# Patient Record
Sex: FEMALE | Race: WHITE | NOT HISPANIC OR LATINO | Employment: FULL TIME | URBAN - METROPOLITAN AREA
[De-identification: names, ages, dates, MRNs, and addresses within clinical notes are randomized per-mention and may not be internally consistent; named-entity substitution may affect disease eponyms.]

---

## 2023-07-27 LAB
ALANINE AMINOTRANSFERASE (SGPT) (U/L) IN SER/PLAS: 17 U/L (ref 7–45)
ALBUMIN (G/DL) IN SER/PLAS: 4.3 G/DL (ref 3.4–5)
ALKALINE PHOSPHATASE (U/L) IN SER/PLAS: 72 U/L (ref 33–110)
ANION GAP IN SER/PLAS: 15 MMOL/L (ref 10–20)
ASPARTATE AMINOTRANSFERASE (SGOT) (U/L) IN SER/PLAS: 17 U/L (ref 9–39)
BASOPHILS (10*3/UL) IN BLOOD BY AUTOMATED COUNT: 0.04 X10E9/L (ref 0–0.1)
BASOPHILS/100 LEUKOCYTES IN BLOOD BY AUTOMATED COUNT: 0.4 % (ref 0–2)
BILIRUBIN TOTAL (MG/DL) IN SER/PLAS: 0.4 MG/DL (ref 0–1.2)
CALCIUM (MG/DL) IN SER/PLAS: 10 MG/DL (ref 8.6–10.3)
CARBON DIOXIDE, TOTAL (MMOL/L) IN SER/PLAS: 28 MMOL/L (ref 21–32)
CHLORIDE (MMOL/L) IN SER/PLAS: 101 MMOL/L (ref 98–107)
CREATININE (MG/DL) IN SER/PLAS: 1.01 MG/DL (ref 0.5–1.05)
EOSINOPHILS (10*3/UL) IN BLOOD BY AUTOMATED COUNT: 0.06 X10E9/L (ref 0–0.7)
EOSINOPHILS/100 LEUKOCYTES IN BLOOD BY AUTOMATED COUNT: 0.6 % (ref 0–6)
ERYTHROCYTE DISTRIBUTION WIDTH (RATIO) BY AUTOMATED COUNT: 14.4 % (ref 11.5–14.5)
ERYTHROCYTE MEAN CORPUSCULAR HEMOGLOBIN CONCENTRATION (G/DL) BY AUTOMATED: 30.7 G/DL (ref 32–36)
ERYTHROCYTE MEAN CORPUSCULAR VOLUME (FL) BY AUTOMATED COUNT: 88 FL (ref 80–100)
ERYTHROCYTES (10*6/UL) IN BLOOD BY AUTOMATED COUNT: 4.88 X10E12/L (ref 4–5.2)
GFR FEMALE: 67 ML/MIN/1.73M2
GLUCOSE (MG/DL) IN SER/PLAS: 83 MG/DL (ref 74–99)
HEMATOCRIT (%) IN BLOOD BY AUTOMATED COUNT: 42.7 % (ref 36–46)
HEMOGLOBIN (G/DL) IN BLOOD: 13.1 G/DL (ref 12–16)
IMMATURE GRANULOCYTES/100 LEUKOCYTES IN BLOOD BY AUTOMATED COUNT: 0.4 % (ref 0–0.9)
LEUKOCYTES (10*3/UL) IN BLOOD BY AUTOMATED COUNT: 10.2 X10E9/L (ref 4.4–11.3)
LYMPHOCYTES (10*3/UL) IN BLOOD BY AUTOMATED COUNT: 2.42 X10E9/L (ref 1.2–4.8)
LYMPHOCYTES/100 LEUKOCYTES IN BLOOD BY AUTOMATED COUNT: 23.7 % (ref 13–44)
MONOCYTES (10*3/UL) IN BLOOD BY AUTOMATED COUNT: 0.63 X10E9/L (ref 0.1–1)
MONOCYTES/100 LEUKOCYTES IN BLOOD BY AUTOMATED COUNT: 6.2 % (ref 2–10)
NEUTROPHILS (10*3/UL) IN BLOOD BY AUTOMATED COUNT: 7.03 X10E9/L (ref 1.2–7.7)
NEUTROPHILS/100 LEUKOCYTES IN BLOOD BY AUTOMATED COUNT: 68.7 % (ref 40–80)
PLATELETS (10*3/UL) IN BLOOD AUTOMATED COUNT: 346 X10E9/L (ref 150–450)
POTASSIUM (MMOL/L) IN SER/PLAS: 3.7 MMOL/L (ref 3.5–5.3)
PROTEIN TOTAL: 7.5 G/DL (ref 6.4–8.2)
SODIUM (MMOL/L) IN SER/PLAS: 140 MMOL/L (ref 136–145)
UREA NITROGEN (MG/DL) IN SER/PLAS: 10 MG/DL (ref 6–23)

## 2023-08-09 ENCOUNTER — TELEPHONE (OUTPATIENT)
Dept: PRIMARY CARE | Facility: CLINIC | Age: 53
End: 2023-08-09
Payer: COMMERCIAL

## 2023-08-09 DIAGNOSIS — I10 BENIGN ESSENTIAL HYPERTENSION: ICD-10-CM

## 2023-08-09 NOTE — TELEPHONE ENCOUNTER
PT WANTS TO CHANGE TO DR DEE     Rx Refill Request Telephone Encounter    Name:  Maryuri Arvizu  :  [id number]  Medication Name:    HYDROCHLOROTHIAZIDE 25MG Q/D   AMLODIPINE 5MG Q/D   Specific Pharmacy location:  [facility]  Date of last appointment:  2022  Date of next appointment:  23  Best number to reach patient:  [phone]

## 2023-08-10 RX ORDER — AMLODIPINE BESYLATE 5 MG/1
5 TABLET ORAL DAILY
Qty: 90 TABLET | Refills: 0 | Status: SHIPPED | OUTPATIENT
Start: 2023-08-10 | End: 2023-08-10

## 2023-08-10 RX ORDER — AMLODIPINE BESYLATE 5 MG/1
5 TABLET ORAL DAILY
COMMUNITY
Start: 2023-03-10 | End: 2023-08-10 | Stop reason: SDUPTHER

## 2023-08-10 RX ORDER — HYDROCHLOROTHIAZIDE 25 MG/1
25 TABLET ORAL DAILY
Qty: 90 TABLET | Refills: 0 | Status: SHIPPED | OUTPATIENT
Start: 2023-08-10 | End: 2023-08-10

## 2023-08-10 RX ORDER — HYDROCHLOROTHIAZIDE 25 MG/1
25 TABLET ORAL DAILY
COMMUNITY
Start: 2023-03-10 | End: 2023-08-10 | Stop reason: SDUPTHER

## 2023-08-17 PROBLEM — R61 NIGHT SWEATS: Status: ACTIVE | Noted: 2023-08-17

## 2023-08-17 PROBLEM — H66.90 OTITIS MEDIA, ACUTE: Status: RESOLVED | Noted: 2023-08-17 | Resolved: 2023-08-17

## 2023-08-17 PROBLEM — G47.33 OSA (OBSTRUCTIVE SLEEP APNEA): Status: ACTIVE | Noted: 2023-08-17

## 2023-08-17 PROBLEM — N39.44 NOCTURNAL ENURESIS: Status: ACTIVE | Noted: 2023-08-17

## 2023-08-17 PROBLEM — G47.19 EXCESSIVE DAYTIME SLEEPINESS: Status: ACTIVE | Noted: 2023-08-17

## 2023-08-17 PROBLEM — R10.84 GENERALIZED ABDOMINAL PAIN: Status: ACTIVE | Noted: 2023-08-17

## 2023-08-17 PROBLEM — K59.00 CONSTIPATION: Status: ACTIVE | Noted: 2023-08-17

## 2023-08-17 PROBLEM — J01.90 ACUTE VIRAL SINUSITIS: Status: RESOLVED | Noted: 2023-08-17 | Resolved: 2023-08-17

## 2023-08-17 PROBLEM — B97.89 ACUTE VIRAL SINUSITIS: Status: RESOLVED | Noted: 2023-08-17 | Resolved: 2023-08-17

## 2023-08-17 PROBLEM — J02.9 SORE THROAT: Status: RESOLVED | Noted: 2023-08-17 | Resolved: 2023-08-17

## 2023-08-17 PROBLEM — M25.562 LEFT KNEE PAIN: Status: RESOLVED | Noted: 2023-08-17 | Resolved: 2023-08-17

## 2023-08-17 PROBLEM — J04.0 LARYNGITIS: Status: RESOLVED | Noted: 2023-08-17 | Resolved: 2023-08-17

## 2023-08-17 PROBLEM — R53.83 FATIGUE: Status: ACTIVE | Noted: 2023-08-17

## 2023-08-17 PROBLEM — I10 ESSENTIAL HYPERTENSION: Status: ACTIVE | Noted: 2023-08-17

## 2023-08-17 RX ORDER — OMEPRAZOLE 20 MG/1
20 CAPSULE, DELAYED RELEASE ORAL DAILY
COMMUNITY
Start: 2023-07-27

## 2023-08-17 RX ORDER — CYANOCOBALAMIN (VITAMIN B-12) 3000MCG/ML
DROPS SUBLINGUAL
COMMUNITY
Start: 2022-04-08

## 2023-08-17 RX ORDER — MULTIVIT WITH IRON,MINERALS
2 TABLET,CHEWABLE ORAL DAILY
COMMUNITY
Start: 2022-04-08

## 2023-08-17 RX ORDER — ASCORBIC ACID 125 MG
TABLET,CHEWABLE ORAL 2 TIMES DAILY
COMMUNITY
Start: 2022-04-08

## 2023-08-17 RX ORDER — LISINOPRIL 40 MG/1
TABLET ORAL
COMMUNITY
End: 2023-08-23 | Stop reason: SINTOL

## 2023-08-17 RX ORDER — ACETAMINOPHEN 500 MG
TABLET ORAL
COMMUNITY
Start: 2022-04-08

## 2023-08-17 RX ORDER — FLUTICASONE PROPIONATE 50 MCG
1 SPRAY, SUSPENSION (ML) NASAL DAILY
COMMUNITY
Start: 2018-03-21

## 2023-08-23 ENCOUNTER — OFFICE VISIT (OUTPATIENT)
Dept: PRIMARY CARE | Facility: CLINIC | Age: 53
End: 2023-08-23
Payer: COMMERCIAL

## 2023-08-23 VITALS
HEART RATE: 86 BPM | BODY MASS INDEX: 45.28 KG/M2 | OXYGEN SATURATION: 93 % | HEIGHT: 67 IN | SYSTOLIC BLOOD PRESSURE: 139 MMHG | DIASTOLIC BLOOD PRESSURE: 88 MMHG | WEIGHT: 288.5 LBS

## 2023-08-23 DIAGNOSIS — I10 ESSENTIAL HYPERTENSION: ICD-10-CM

## 2023-08-23 DIAGNOSIS — R06.83 LOUD SNORING: ICD-10-CM

## 2023-08-23 DIAGNOSIS — E66.01 CLASS 3 SEVERE OBESITY DUE TO EXCESS CALORIES WITH SERIOUS COMORBIDITY AND BODY MASS INDEX (BMI) OF 45.0 TO 49.9 IN ADULT (MULTI): ICD-10-CM

## 2023-08-23 DIAGNOSIS — E53.8 B12 DEFICIENCY: ICD-10-CM

## 2023-08-23 DIAGNOSIS — R06.09 DOE (DYSPNEA ON EXERTION): Primary | ICD-10-CM

## 2023-08-23 DIAGNOSIS — E55.9 VITAMIN D DEFICIENCY: ICD-10-CM

## 2023-08-23 DIAGNOSIS — R00.2 PALPITATIONS: ICD-10-CM

## 2023-08-23 PROBLEM — E66.813 CLASS 3 SEVERE OBESITY DUE TO EXCESS CALORIES WITH SERIOUS COMORBIDITY AND BODY MASS INDEX (BMI) OF 45.0 TO 49.9 IN ADULT: Status: ACTIVE | Noted: 2023-08-23

## 2023-08-23 LAB — POC HEMOGLOBIN A1C: 5.9 % (ref 4.2–6.5)

## 2023-08-23 PROCEDURE — 83036 HEMOGLOBIN GLYCOSYLATED A1C: CPT | Performed by: FAMILY MEDICINE

## 2023-08-23 PROCEDURE — 93000 ELECTROCARDIOGRAM COMPLETE: CPT | Performed by: FAMILY MEDICINE

## 2023-08-23 PROCEDURE — 1036F TOBACCO NON-USER: CPT | Performed by: FAMILY MEDICINE

## 2023-08-23 PROCEDURE — 99214 OFFICE O/P EST MOD 30 MIN: CPT | Performed by: FAMILY MEDICINE

## 2023-08-23 PROCEDURE — 3075F SYST BP GE 130 - 139MM HG: CPT | Performed by: FAMILY MEDICINE

## 2023-08-23 PROCEDURE — 3008F BODY MASS INDEX DOCD: CPT | Performed by: FAMILY MEDICINE

## 2023-08-23 PROCEDURE — 3079F DIAST BP 80-89 MM HG: CPT | Performed by: FAMILY MEDICINE

## 2023-08-23 ASSESSMENT — ENCOUNTER SYMPTOMS
ABDOMINAL PAIN: 0
COUGH: 0
TROUBLE SWALLOWING: 0
DIFFICULTY URINATING: 0
DEPRESSION: 0
LOSS OF SENSATION IN FEET: 0
NUMBNESS: 0
CHILLS: 0
OCCASIONAL FEELINGS OF UNSTEADINESS: 0
NAUSEA: 0
WEAKNESS: 0
CONFUSION: 0
FEVER: 0
WHEEZING: 0
VOMITING: 0
BLOOD IN STOOL: 0
DYSURIA: 0
UNEXPECTED WEIGHT CHANGE: 0
SHORTNESS OF BREATH: 1
DIARRHEA: 0

## 2023-08-23 ASSESSMENT — PATIENT HEALTH QUESTIONNAIRE - PHQ9
10. IF YOU CHECKED OFF ANY PROBLEMS, HOW DIFFICULT HAVE THESE PROBLEMS MADE IT FOR YOU TO DO YOUR WORK, TAKE CARE OF THINGS AT HOME, OR GET ALONG WITH OTHER PEOPLE: NOT DIFFICULT AT ALL
SUM OF ALL RESPONSES TO PHQ9 QUESTIONS 1 AND 2: 2
2. FEELING DOWN, DEPRESSED OR HOPELESS: SEVERAL DAYS
1. LITTLE INTEREST OR PLEASURE IN DOING THINGS: SEVERAL DAYS

## 2023-08-23 NOTE — ASSESSMENT & PLAN NOTE
Try taking Amlodipine in the evening. May consider trying to stop the Amlodipine, due to leg swelling, but check echocardiogram and continue amlodipine for now. 5-pillow orthopnea? Check echocardiogram.

## 2023-08-23 NOTE — PROGRESS NOTES
"Subjective   Patient ID: Maryuri Arvizu is a 52 y.o. female who presents for Hypertension (Pt presents for htn check up, wants to discuss weight loss, possible labs, rx's.BL).  HPI    Would like to lose weight.     SBP at home tends to be low 100s. BP in the morning 140s/80s. Takes Amlodipine about 0730, hydrochlorothiazide 0730. If she forgets her hydrochlorothiazide, feels more swelling and perhaps some shortness of breathing. C/o occasional SUN. Denies orthopnea, PND. Snores. Denies observed apnea. Props herself up with 5 pillows. Morning headaches 2-3x weekly. Some daytime fatigue. H/o CPAP in her 30s.    Recently found out has fatty liver, has follow-up with Dr. Cho. Reports eye doctors sees changes from hypertension.    C/o random episodes of dizziness, feels like she's leaning to the left. Checks her BP at home, normal. If she lays on the left side, feels as if her heart is racing. Occasional palpitations otherwise.      Review of Systems   Constitutional:  Negative for chills, fever and unexpected weight change.   HENT:  Negative for ear pain and trouble swallowing.    Respiratory:  Positive for shortness of breath. Negative for cough and wheezing.    Cardiovascular:  Positive for leg swelling. Negative for chest pain.   Gastrointestinal:  Negative for abdominal pain, blood in stool, diarrhea, nausea and vomiting.   Genitourinary:  Negative for difficulty urinating and dysuria.   Skin:  Negative for rash.   Neurological:  Negative for syncope, weakness and numbness.   Psychiatric/Behavioral:  Negative for behavioral problems and confusion.          Objective   /88   Pulse 86   Ht 1.702 m (5' 7\")   Wt 131 kg (288 lb 8 oz)   SpO2 93%   BMI 45.19 kg/m²     Physical Exam  Vitals and nursing note reviewed.   Constitutional:       General: She is not in acute distress.     Appearance: She is not diaphoretic.   HENT:      Head: Normocephalic and atraumatic.   Eyes:      General: No scleral " icterus.     Conjunctiva/sclera: Conjunctivae normal.   Cardiovascular:      Rate and Rhythm: Normal rate and regular rhythm.      Heart sounds: Normal heart sounds.   Pulmonary:      Effort: Pulmonary effort is normal.      Breath sounds: Normal breath sounds. No wheezing, rhonchi or rales.   Musculoskeletal:      Right lower leg: No edema.      Left lower leg: No edema.   Skin:     General: Skin is warm and dry.   Neurological:      General: No focal deficit present.      Mental Status: She is alert. Mental status is at baseline.   Psychiatric:         Mood and Affect: Mood normal.         Thought Content: Thought content normal.         Assessment/Plan   Problem List Items Addressed This Visit       Essential hypertension     Try taking Amlodipine in the evening. May consider trying to stop the Amlodipine, due to leg swelling, but check echocardiogram and continue amlodipine for now. 5-pillow orthopnea? Check echocardiogram.         Relevant Orders    ECG 12 lead (Clinic Performed) (Completed)    Transthoracic echo (TTE) complete    Home sleep apnea test (HSAT)    Lipid Panel    SUN (dyspnea on exertion) - Primary     Inconsistent, i.e., doesn't happen in the gym. Check echocardiogram.         Relevant Orders    ECG 12 lead (Clinic Performed) (Completed)    Transthoracic echo (TTE) complete    Palpitations    Relevant Orders    Holter or Event Cardiac Monitor    Loud snoring     Borderline ESS = 9, but morning headaches, hypertension worse in the morning. Try to check HSAT.         Relevant Orders    Home sleep apnea test (HSAT)    Vitamin D deficiency    Relevant Orders    Vitamin D 25-Hydroxy,Total    B12 deficiency    Relevant Orders    Ferritin    Iron and TIBC    Vitamin B12    POCT glycosylated hemoglobin (Hb A1C) manually resulted (Completed)    Class 3 severe obesity due to excess calories with serious comorbidity and body mass index (BMI) of 45.0 to 49.9 in adult (CMS/[facility])    Relevant Orders    Referral  to Comprehensive Weight Loss

## 2023-09-28 ENCOUNTER — LAB (OUTPATIENT)
Dept: LAB | Facility: LAB | Age: 53
End: 2023-09-28
Payer: COMMERCIAL

## 2023-09-28 DIAGNOSIS — E55.9 VITAMIN D DEFICIENCY: ICD-10-CM

## 2023-09-28 DIAGNOSIS — I10 ESSENTIAL HYPERTENSION: ICD-10-CM

## 2023-09-28 DIAGNOSIS — E53.8 B12 DEFICIENCY: ICD-10-CM

## 2023-09-28 LAB
CALCIDIOL (25 OH VITAMIN D3) (NG/ML) IN SER/PLAS: 37 NG/ML
CHOLESTEROL (MG/DL) IN SER/PLAS: 214 MG/DL (ref 0–199)
CHOLESTEROL IN HDL (MG/DL) IN SER/PLAS: 48.5 MG/DL
CHOLESTEROL/HDL RATIO: 4.4
COBALAMIN (VITAMIN B12) (PG/ML) IN SER/PLAS: 307 PG/ML (ref 211–911)
FERRITIN (UG/LL) IN SER/PLAS: 134 UG/L (ref 8–150)
IRON (UG/DL) IN SER/PLAS: 52 UG/DL (ref 35–150)
IRON BINDING CAPACITY (UG/DL) IN SER/PLAS: 385 UG/DL (ref 240–445)
IRON SATURATION (%) IN SER/PLAS: 14 % (ref 25–45)
LDL: 149 MG/DL (ref 0–99)
TOBACCO SCREEN, URINE: NEGATIVE
TRIGLYCERIDE (MG/DL) IN SER/PLAS: 84 MG/DL (ref 0–149)
VLDL: 17 MG/DL (ref 0–40)

## 2023-09-28 PROCEDURE — 83540 ASSAY OF IRON: CPT

## 2023-09-28 PROCEDURE — 82728 ASSAY OF FERRITIN: CPT

## 2023-09-28 PROCEDURE — 36415 COLL VENOUS BLD VENIPUNCTURE: CPT

## 2023-09-28 PROCEDURE — 82306 VITAMIN D 25 HYDROXY: CPT

## 2023-09-28 PROCEDURE — 80061 LIPID PANEL: CPT

## 2023-09-28 PROCEDURE — 83550 IRON BINDING TEST: CPT

## 2023-09-28 PROCEDURE — 82607 VITAMIN B-12: CPT

## 2023-10-27 ENCOUNTER — TELEPHONE (OUTPATIENT)
Dept: SURGERY | Facility: CLINIC | Age: 53
End: 2023-10-27
Payer: COMMERCIAL

## 2023-11-01 DIAGNOSIS — I10 BENIGN ESSENTIAL HYPERTENSION: ICD-10-CM

## 2023-11-01 PROBLEM — G44.89 OTHER HEADACHE SYNDROME: Status: ACTIVE | Noted: 2017-11-27
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
## 2023-11-01 PROBLEM — M54.2 NECK PAIN: Status: ACTIVE | Noted: 2017-11-17

## 2023-11-06 ENCOUNTER — PHARMACY VISIT (OUTPATIENT)
Dept: PHARMACY | Facility: CLINIC | Age: 53
End: 2023-11-06
Payer: COMMERCIAL

## 2023-11-06 PROCEDURE — RXMED WILLOW AMBULATORY MEDICATION CHARGE

## 2023-11-06 RX ORDER — AMLODIPINE BESYLATE 5 MG/1
5 TABLET ORAL DAILY
Qty: 90 TABLET | Refills: 0 | Status: SHIPPED | OUTPATIENT
Start: 2023-11-06 | End: 2024-02-29 | Stop reason: SDUPTHER

## 2023-11-06 RX ORDER — HYDROCHLOROTHIAZIDE 25 MG/1
25 TABLET ORAL DAILY
Qty: 90 TABLET | Refills: 0 | Status: SHIPPED | OUTPATIENT
Start: 2023-11-06 | End: 2024-02-29 | Stop reason: SDUPTHER

## 2024-02-02 ENCOUNTER — APPOINTMENT (OUTPATIENT)
Dept: CARDIOLOGY | Facility: HOSPITAL | Age: 54
End: 2024-02-02
Payer: COMMERCIAL

## 2024-02-06 ENCOUNTER — HOSPITAL ENCOUNTER (OUTPATIENT)
Dept: RADIOLOGY | Facility: HOSPITAL | Age: 54
Discharge: HOME | End: 2024-02-06
Payer: COMMERCIAL

## 2024-02-06 DIAGNOSIS — R92.8 ABNORMAL MAMMOGRAM OF LEFT BREAST: Primary | ICD-10-CM

## 2024-02-06 DIAGNOSIS — Z12.31 SCREENING MAMMOGRAM FOR BREAST CANCER: ICD-10-CM

## 2024-02-06 PROCEDURE — 77063 BREAST TOMOSYNTHESIS BI: CPT | Performed by: RADIOLOGY

## 2024-02-06 PROCEDURE — 77067 SCR MAMMO BI INCL CAD: CPT

## 2024-02-06 PROCEDURE — 77067 SCR MAMMO BI INCL CAD: CPT | Performed by: RADIOLOGY

## 2024-02-13 ENCOUNTER — HOSPITAL ENCOUNTER (OUTPATIENT)
Dept: RADIOLOGY | Facility: HOSPITAL | Age: 54
Discharge: HOME | End: 2024-02-13
Payer: COMMERCIAL

## 2024-02-13 DIAGNOSIS — R92.8 ABNORMAL MAMMOGRAM OF LEFT BREAST: ICD-10-CM

## 2024-02-13 DIAGNOSIS — R92.8 OTHER ABNORMAL AND INCONCLUSIVE FINDINGS ON DIAGNOSTIC IMAGING OF BREAST: ICD-10-CM

## 2024-02-13 PROCEDURE — 77065 DX MAMMO INCL CAD UNI: CPT | Mod: LEFT SIDE | Performed by: RADIOLOGY

## 2024-02-13 PROCEDURE — 76642 ULTRASOUND BREAST LIMITED: CPT | Mod: LEFT SIDE | Performed by: RADIOLOGY

## 2024-02-13 PROCEDURE — 77061 BREAST TOMOSYNTHESIS UNI: CPT | Mod: LEFT SIDE | Performed by: RADIOLOGY

## 2024-02-13 PROCEDURE — 77061 BREAST TOMOSYNTHESIS UNI: CPT | Mod: LT

## 2024-02-13 PROCEDURE — 76982 USE 1ST TARGET LESION: CPT | Mod: LT

## 2024-02-13 PROCEDURE — 76642 ULTRASOUND BREAST LIMITED: CPT | Mod: LT

## 2024-02-29 DIAGNOSIS — I10 BENIGN ESSENTIAL HYPERTENSION: ICD-10-CM

## 2024-03-04 ENCOUNTER — PHARMACY VISIT (OUTPATIENT)
Dept: PHARMACY | Facility: CLINIC | Age: 54
End: 2024-03-04
Payer: COMMERCIAL

## 2024-03-04 PROCEDURE — RXMED WILLOW AMBULATORY MEDICATION CHARGE

## 2024-03-04 RX ORDER — HYDROCHLOROTHIAZIDE 25 MG/1
25 TABLET ORAL DAILY
Qty: 90 TABLET | Refills: 0 | Status: SHIPPED | OUTPATIENT
Start: 2024-03-04 | End: 2024-05-30 | Stop reason: SDUPTHER

## 2024-03-04 RX ORDER — AMLODIPINE BESYLATE 5 MG/1
5 TABLET ORAL DAILY
Qty: 90 TABLET | Refills: 0 | Status: SHIPPED | OUTPATIENT
Start: 2024-03-04 | End: 2024-05-30 | Stop reason: SDUPTHER

## 2024-04-01 DIAGNOSIS — K04.7 DENTAL INFECTION: Primary | ICD-10-CM

## 2024-04-01 RX ORDER — AMOXICILLIN 875 MG/1
875 TABLET, FILM COATED ORAL 2 TIMES DAILY
Qty: 14 TABLET | Refills: 0 | Status: SHIPPED | OUTPATIENT
Start: 2024-04-01 | End: 2024-04-19

## 2024-04-02 PROCEDURE — RXMED WILLOW AMBULATORY MEDICATION CHARGE

## 2024-04-12 ENCOUNTER — PHARMACY VISIT (OUTPATIENT)
Dept: PHARMACY | Facility: CLINIC | Age: 54
End: 2024-04-12
Payer: COMMERCIAL

## 2024-05-30 DIAGNOSIS — I10 BENIGN ESSENTIAL HYPERTENSION: ICD-10-CM

## 2024-05-31 PROCEDURE — RXMED WILLOW AMBULATORY MEDICATION CHARGE

## 2024-05-31 RX ORDER — HYDROCHLOROTHIAZIDE 25 MG/1
25 TABLET ORAL DAILY
Qty: 90 TABLET | Refills: 0 | Status: SHIPPED | OUTPATIENT
Start: 2024-05-31 | End: 2025-05-31

## 2024-05-31 RX ORDER — AMLODIPINE BESYLATE 5 MG/1
5 TABLET ORAL DAILY
Qty: 90 TABLET | Refills: 0 | Status: SHIPPED | OUTPATIENT
Start: 2024-05-31 | End: 2025-05-31

## 2024-06-03 ENCOUNTER — PHARMACY VISIT (OUTPATIENT)
Dept: PHARMACY | Facility: CLINIC | Age: 54
End: 2024-06-03
Payer: COMMERCIAL

## 2024-06-04 ENCOUNTER — TELEPHONE (OUTPATIENT)
Dept: PRIMARY CARE | Facility: CLINIC | Age: 54
End: 2024-06-04
Payer: COMMERCIAL

## 2024-06-04 NOTE — TELEPHONE ENCOUNTER
Pt calling she has had some burning and pain in her breast she states she did have an abnormal mamm and she has a hx of breast cancer. She wasn't sure if she needed to see TC for this?

## 2024-07-29 ENCOUNTER — APPOINTMENT (OUTPATIENT)
Dept: SURGERY | Facility: CLINIC | Age: 54
End: 2024-07-29
Payer: COMMERCIAL

## 2024-07-29 VITALS
DIASTOLIC BLOOD PRESSURE: 88 MMHG | OXYGEN SATURATION: 97 % | HEIGHT: 68 IN | HEART RATE: 97 BPM | SYSTOLIC BLOOD PRESSURE: 142 MMHG | TEMPERATURE: 96.9 F | RESPIRATION RATE: 16 BRPM | BODY MASS INDEX: 42.95 KG/M2 | WEIGHT: 283.4 LBS

## 2024-07-29 DIAGNOSIS — Z80.3 FAMILY HISTORY OF BREAST CANCER: ICD-10-CM

## 2024-07-29 DIAGNOSIS — N64.4 BREAST PAIN: Primary | ICD-10-CM

## 2024-07-29 PROCEDURE — 99203 OFFICE O/P NEW LOW 30 MIN: CPT | Performed by: SURGERY

## 2024-07-29 PROCEDURE — 3008F BODY MASS INDEX DOCD: CPT | Performed by: SURGERY

## 2024-07-29 PROCEDURE — 3079F DIAST BP 80-89 MM HG: CPT | Performed by: SURGERY

## 2024-07-29 PROCEDURE — 3077F SYST BP >= 140 MM HG: CPT | Performed by: SURGERY

## 2024-07-29 NOTE — PROGRESS NOTES
Subjective   Patient ID: Maryuri Arvizu is a 53 y.o. female who presents for New Patient Visit (NPV Left Breast Pain).  HPI this is a pleasant patient here today because she has had left breast pain since her mammogram which was done in February.  It is been a little bit better on occasions when she is taking some turmeric and lee ann.  Initially she was unable to even sleep on that side which is her custom.  She does not wear a bra during the night.  She normally wears a 44 double D.    The patient is overweight but is lost 20 pounds recently and she also is trying to eat healthy more plant-based exercise etc.  She does not drink caffeine in any significant amount and not even daily.    The patient had 2 pregnancies the first 1 at age 27.  She had a hysterectomy in her 30s because of heavy bleeding.  The maternal grandmother had breast cancer very young apparently and her maternal cousins had breast cancer as well.  Her mother has been well.    Her past medical history is remarkable for some hypertension well-controlled on medication as she also has slightly elevated cholesterol.    Social history she does not smoke or drink alcohol    Review of Systems 10 point review is otherwise okay    Objective   Physical Exam on examination the patient has large pendulous breasts.  Skin exam is unremarkable there is no nipple discharge there is no retraction.  There are no palpable masses in either breast.  She has some fibroglandular tissue in both breasts and more particularly in the inframammary fold region.  Axillae are negative and supraclavicular fossae are negative  The mammogram from February shows a focal asymmetry in the upper outer left breast ultrasound was performed and this showed a 2 mm cyst otherwise there was no concern.    Assessment/Plan as I discussed with the patient there are 2 objectives with breast pain #1 is to make sure that there is no cancer.  There is no evidence for any mammographic abnormality  and her physical exam is unremarkable.  The second objective is to try to relieve the pain if possible.  This seems to be chronologically related to her mammogram therefore I would recommend that the turmeric is okay to use the patient if she can tolerate wearing a bra during the night that might also be helpful as long as it is not an underwire bra weight loss will continue to be helpful and once she has reached her goal weight she may want to consider a breast reduction because of the neck pain and shoulder pain that she suffers.  In general I think that this pain will gradually go away with the supportive measures.  In addition her family history is somewhat remote but definitely is there there are multiple family members with a history of breast cancer at young ages therefore we also have referred her for genetic testing.           Rosita Jj MD 07/29/24 3:52 PM

## 2024-08-14 ENCOUNTER — TELEPHONE (OUTPATIENT)
Dept: OBSTETRICS AND GYNECOLOGY | Facility: CLINIC | Age: 54
End: 2024-08-14
Payer: COMMERCIAL

## 2024-09-18 ENCOUNTER — APPOINTMENT (OUTPATIENT)
Dept: OBSTETRICS AND GYNECOLOGY | Facility: CLINIC | Age: 54
End: 2024-09-18
Payer: COMMERCIAL

## 2024-10-30 DIAGNOSIS — I10 BENIGN ESSENTIAL HYPERTENSION: ICD-10-CM

## 2024-10-30 PROCEDURE — RXMED WILLOW AMBULATORY MEDICATION CHARGE

## 2024-10-30 RX ORDER — HYDROCHLOROTHIAZIDE 25 MG/1
25 TABLET ORAL DAILY
Qty: 90 TABLET | Refills: 0 | Status: SHIPPED | OUTPATIENT
Start: 2024-10-30 | End: 2025-10-30

## 2024-10-30 RX ORDER — AMLODIPINE BESYLATE 5 MG/1
5 TABLET ORAL DAILY
Qty: 90 TABLET | Refills: 0 | Status: SHIPPED | OUTPATIENT
Start: 2024-10-30 | End: 2025-10-30

## 2024-11-01 PROBLEM — E83.10 DISORDER OF IRON METABOLISM: Status: ACTIVE | Noted: 2024-11-01

## 2024-11-01 PROBLEM — M54.2 NECK PAIN: Status: RESOLVED | Noted: 2017-11-17 | Resolved: 2024-11-01

## 2024-11-01 PROBLEM — E66.9 OBESITY WITH BODY MASS INDEX 30 OR GREATER: Status: ACTIVE | Noted: 2024-11-01
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
## 2024-11-01 PROBLEM — Z85.820 HISTORY OF MALIGNANT MELANOMA: Status: ACTIVE | Noted: 2024-11-01

## 2024-11-01 PROBLEM — N63.20 PAINFUL LUMPY LEFT BREAST: Status: ACTIVE | Noted: 2024-11-01

## 2024-11-01 PROBLEM — K59.00 CONSTIPATION: Status: RESOLVED | Noted: 2023-08-17 | Resolved: 2024-11-01

## 2024-11-01 PROBLEM — R11.2 NAUSEA AND VOMITING: Status: RESOLVED | Noted: 2024-11-01 | Resolved: 2024-11-01

## 2024-11-01 PROBLEM — R10.84 GENERALIZED ABDOMINAL PAIN: Status: RESOLVED | Noted: 2023-08-17 | Resolved: 2024-11-01

## 2024-11-01 PROBLEM — G44.89 OTHER HEADACHE SYNDROME: Status: RESOLVED | Noted: 2017-11-27 | Resolved: 2024-11-01

## 2024-11-01 PROBLEM — N64.4 PAINFUL LUMPY LEFT BREAST: Status: ACTIVE | Noted: 2024-11-01

## 2024-11-05 ENCOUNTER — APPOINTMENT (OUTPATIENT)
Dept: PRIMARY CARE | Facility: CLINIC | Age: 54
End: 2024-11-05
Payer: COMMERCIAL

## 2024-11-06 ENCOUNTER — PHARMACY VISIT (OUTPATIENT)
Dept: PHARMACY | Facility: CLINIC | Age: 54
End: 2024-11-06
Payer: COMMERCIAL

## 2025-01-09 ENCOUNTER — APPOINTMENT (OUTPATIENT)
Dept: RADIOLOGY | Facility: HOSPITAL | Age: 55
End: 2025-01-09
Payer: COMMERCIAL

## 2025-01-09 ENCOUNTER — HOSPITAL ENCOUNTER (EMERGENCY)
Facility: HOSPITAL | Age: 55
Discharge: HOME | End: 2025-01-09
Attending: EMERGENCY MEDICINE
Payer: COMMERCIAL

## 2025-01-09 VITALS
DIASTOLIC BLOOD PRESSURE: 81 MMHG | SYSTOLIC BLOOD PRESSURE: 143 MMHG | WEIGHT: 290 LBS | BODY MASS INDEX: 43.95 KG/M2 | HEIGHT: 68 IN | TEMPERATURE: 97.5 F | HEART RATE: 75 BPM | RESPIRATION RATE: 17 BRPM | OXYGEN SATURATION: 96 %

## 2025-01-09 DIAGNOSIS — R10.9 ABDOMINAL PAIN, UNSPECIFIED ABDOMINAL LOCATION: Primary | ICD-10-CM

## 2025-01-09 LAB
ALBUMIN SERPL BCP-MCNC: 4.3 G/DL (ref 3.4–5)
ALP SERPL-CCNC: 66 U/L (ref 33–110)
ALT SERPL W P-5'-P-CCNC: 22 U/L (ref 7–45)
ANION GAP SERPL CALC-SCNC: 9 MMOL/L (ref 10–20)
APPEARANCE UR: CLEAR
AST SERPL W P-5'-P-CCNC: 19 U/L (ref 9–39)
BASOPHILS # BLD AUTO: 0.02 X10*3/UL (ref 0–0.1)
BASOPHILS NFR BLD AUTO: 0.2 %
BILIRUB SERPL-MCNC: 0.4 MG/DL (ref 0–1.2)
BILIRUB UR STRIP.AUTO-MCNC: NEGATIVE MG/DL
BUN SERPL-MCNC: 11 MG/DL (ref 6–23)
CALCIUM SERPL-MCNC: 9.2 MG/DL (ref 8.6–10.3)
CHLORIDE SERPL-SCNC: 102 MMOL/L (ref 98–107)
CO2 SERPL-SCNC: 30 MMOL/L (ref 21–32)
COLOR UR: COLORLESS
CREAT SERPL-MCNC: 0.86 MG/DL (ref 0.5–1.05)
EGFRCR SERPLBLD CKD-EPI 2021: 80 ML/MIN/1.73M*2
EOSINOPHIL # BLD AUTO: 0.06 X10*3/UL (ref 0–0.7)
EOSINOPHIL NFR BLD AUTO: 0.7 %
ERYTHROCYTE [DISTWIDTH] IN BLOOD BY AUTOMATED COUNT: 14 % (ref 11.5–14.5)
GLUCOSE SERPL-MCNC: 93 MG/DL (ref 74–99)
GLUCOSE UR STRIP.AUTO-MCNC: NORMAL MG/DL
HCT VFR BLD AUTO: 40.8 % (ref 36–46)
HGB BLD-MCNC: 13 G/DL (ref 12–16)
IMM GRANULOCYTES # BLD AUTO: 0.02 X10*3/UL (ref 0–0.7)
IMM GRANULOCYTES NFR BLD AUTO: 0.2 % (ref 0–0.9)
KETONES UR STRIP.AUTO-MCNC: NEGATIVE MG/DL
LACTATE SERPL-SCNC: 0.7 MMOL/L (ref 0.4–2)
LEUKOCYTE ESTERASE UR QL STRIP.AUTO: NEGATIVE
LIPASE SERPL-CCNC: 16 U/L (ref 9–82)
LYMPHOCYTES # BLD AUTO: 1.56 X10*3/UL (ref 1.2–4.8)
LYMPHOCYTES NFR BLD AUTO: 19.3 %
MAGNESIUM SERPL-MCNC: 2.09 MG/DL (ref 1.6–2.4)
MCH RBC QN AUTO: 27 PG (ref 26–34)
MCHC RBC AUTO-ENTMCNC: 31.9 G/DL (ref 32–36)
MCV RBC AUTO: 85 FL (ref 80–100)
MONOCYTES # BLD AUTO: 0.57 X10*3/UL (ref 0.1–1)
MONOCYTES NFR BLD AUTO: 7.1 %
NEUTROPHILS # BLD AUTO: 5.85 X10*3/UL (ref 1.2–7.7)
NEUTROPHILS NFR BLD AUTO: 72.5 %
NITRITE UR QL STRIP.AUTO: NEGATIVE
NRBC BLD-RTO: 0 /100 WBCS (ref 0–0)
PH UR STRIP.AUTO: 6.5 [PH]
PLATELET # BLD AUTO: 294 X10*3/UL (ref 150–450)
POTASSIUM SERPL-SCNC: 3.3 MMOL/L (ref 3.5–5.3)
PROT SERPL-MCNC: 7.1 G/DL (ref 6.4–8.2)
PROT UR STRIP.AUTO-MCNC: NEGATIVE MG/DL
RBC # BLD AUTO: 4.82 X10*6/UL (ref 4–5.2)
RBC # UR STRIP.AUTO: NEGATIVE /UL
SODIUM SERPL-SCNC: 138 MMOL/L (ref 136–145)
SP GR UR STRIP.AUTO: 1
UROBILINOGEN UR STRIP.AUTO-MCNC: NORMAL MG/DL
WBC # BLD AUTO: 8.1 X10*3/UL (ref 4.4–11.3)

## 2025-01-09 PROCEDURE — 83735 ASSAY OF MAGNESIUM: CPT | Performed by: EMERGENCY MEDICINE

## 2025-01-09 PROCEDURE — 99284 EMERGENCY DEPT VISIT MOD MDM: CPT | Mod: 25 | Performed by: EMERGENCY MEDICINE

## 2025-01-09 PROCEDURE — 81003 URINALYSIS AUTO W/O SCOPE: CPT | Performed by: EMERGENCY MEDICINE

## 2025-01-09 PROCEDURE — 76705 ECHO EXAM OF ABDOMEN: CPT

## 2025-01-09 PROCEDURE — 85025 COMPLETE CBC W/AUTO DIFF WBC: CPT | Performed by: EMERGENCY MEDICINE

## 2025-01-09 PROCEDURE — 76705 ECHO EXAM OF ABDOMEN: CPT | Mod: FOREIGN READ | Performed by: RADIOLOGY

## 2025-01-09 PROCEDURE — 36415 COLL VENOUS BLD VENIPUNCTURE: CPT | Performed by: EMERGENCY MEDICINE

## 2025-01-09 PROCEDURE — 80053 COMPREHEN METABOLIC PANEL: CPT | Performed by: EMERGENCY MEDICINE

## 2025-01-09 PROCEDURE — 83690 ASSAY OF LIPASE: CPT | Performed by: EMERGENCY MEDICINE

## 2025-01-09 PROCEDURE — 83605 ASSAY OF LACTIC ACID: CPT | Performed by: EMERGENCY MEDICINE

## 2025-01-09 ASSESSMENT — LIFESTYLE VARIABLES
HAVE PEOPLE ANNOYED YOU BY CRITICIZING YOUR DRINKING: NO
HAVE YOU EVER FELT YOU SHOULD CUT DOWN ON YOUR DRINKING: NO
EVER FELT BAD OR GUILTY ABOUT YOUR DRINKING: NO
EVER HAD A DRINK FIRST THING IN THE MORNING TO STEADY YOUR NERVES TO GET RID OF A HANGOVER: NO
TOTAL SCORE: 0

## 2025-01-09 ASSESSMENT — PAIN - FUNCTIONAL ASSESSMENT: PAIN_FUNCTIONAL_ASSESSMENT: 0-10

## 2025-01-09 ASSESSMENT — COLUMBIA-SUICIDE SEVERITY RATING SCALE - C-SSRS
6. HAVE YOU EVER DONE ANYTHING, STARTED TO DO ANYTHING, OR PREPARED TO DO ANYTHING TO END YOUR LIFE?: NO
2. HAVE YOU ACTUALLY HAD ANY THOUGHTS OF KILLING YOURSELF?: NO
1. IN THE PAST MONTH, HAVE YOU WISHED YOU WERE DEAD OR WISHED YOU COULD GO TO SLEEP AND NOT WAKE UP?: NO

## 2025-01-09 ASSESSMENT — PAIN DESCRIPTION - LOCATION: LOCATION: ABDOMEN

## 2025-01-09 ASSESSMENT — PAIN DESCRIPTION - ORIENTATION: ORIENTATION: RIGHT;UPPER

## 2025-01-09 ASSESSMENT — PAIN SCALES - GENERAL: PAINLEVEL_OUTOF10: 3

## 2025-01-09 NOTE — ED PROVIDER NOTES
HPI   Chief Complaint   Patient presents with    Abdominal Pain     RUQ pain x3 days last night worsened. Feels like her ribs are pushing in every time she sits. Endorses nausea, increase in urination. and yellow soft stools. Only surgical hx in abdominal area is a hysterectomy        54-year-old female here for chief complaint of right upper quadrant pain for the last 3 days.  Patient states the pain comes and goes.  She just changed her diet a couple weeks ago she got out of the greasy fried fatty foods and she has been eating more healthy things.  The pain is located in the right upper quadrant radiates around to the back described as a burning sensation.  She states no rashes.  No fevers or chills.  She has a little bit of nausea but no vomiting or diarrhea.  She actually had constipation and then ate half a cup of salad today and her stools became a little bit looser.  No blood in her stools.  She has a history of fatty liver.  She has never had a colonoscopy before.    10 point systems reviewed and is negative unless otherwise stated              Patient History   Past Medical History:   Diagnosis Date    Acute viral sinusitis 08/17/2023    Left ankle swelling 10/24/2016    Left knee pain 08/17/2023    Nausea and vomiting 11/01/2024    Other specified abnormal findings of blood chemistry     Melanemia    Other specified health status     Known health problems: none    Otitis media, acute 08/17/2023    Personal history of malignant melanoma of skin     History of malignant melanoma    Sore throat 08/17/2023     Past Surgical History:   Procedure Laterality Date    HYSTERECTOMY  05/23/2014    Hysterectomy     Family History   Problem Relation Name Age of Onset    Thyroid cancer Father       Social History     Tobacco Use    Smoking status: Former     Types: Cigarettes    Smokeless tobacco: Never   Vaping Use    Vaping status: Never Used   Substance Use Topics    Alcohol use: Yes     Comment: rarely    Drug use:  Never       Physical Exam   ED Triage Vitals [01/09/25 1653]   Temperature Heart Rate Respirations BP   36.4 °C (97.5 °F) 87 16 (!) 184/103      Pulse Ox Temp Source Heart Rate Source Patient Position   97 % Skin Monitor Sitting      BP Location FiO2 (%)     Left arm --       Physical Exam  Vitals and nursing note reviewed.   Constitutional:       Appearance: Normal appearance. She is obese.   HENT:      Head: Normocephalic and atraumatic.      Nose: Nose normal.      Mouth/Throat:      Mouth: Mucous membranes are moist.   Eyes:      Extraocular Movements: Extraocular movements intact.      Pupils: Pupils are equal, round, and reactive to light.   Cardiovascular:      Rate and Rhythm: Normal rate and regular rhythm.   Pulmonary:      Effort: Pulmonary effort is normal.      Breath sounds: Normal breath sounds.   Abdominal:      General: Abdomen is flat. Bowel sounds are decreased.      Palpations: Abdomen is soft.      Tenderness: There is abdominal tenderness in the right upper quadrant.   Musculoskeletal:         General: Normal range of motion.      Cervical back: Normal range of motion.   Skin:     General: Skin is warm and dry.      Capillary Refill: Capillary refill takes less than 2 seconds.   Neurological:      General: No focal deficit present.      Mental Status: She is alert.   Psychiatric:         Mood and Affect: Mood normal.           ED Course & MDM   ED Course as of 01/09/25 1927   Thu Jan 09, 2025 1926 Patient was fully worked up found to have no acute process except for slightly low potassium at 3.3.  She will replace this at home she states.  She does not want to the CT scan she gets claustrophobic.  I do feel this is reasonable since her ultrasound is negative.  She can be safely discharged at this time with close follow-up.  Differential includes biliary colic kidney stone UTI pyelonephritis  Consider renal ultrasound or CT scan in future [TP]      ED Course User Index  [TP] Kerri GREEN  DO Larry         Diagnoses as of 01/09/25 1927   Abdominal pain, unspecified abdominal location                 No data recorded     [place] Coma Scale Score: 15 (01/09/25 1730 : Roge Worthington RN)                           Medical Decision Making  Medical Decision Making:   [unfilled]     Kerri Juarez D.O.  Emergency Medicine          Procedure  Procedures     Kerri Juarez DO  01/09/25 1927

## 2025-01-09 NOTE — ED TRIAGE NOTES
Patient here for abdominal pain RUQ pain x3 days last night worsened. Feels like her ribs are pushing in every time she sits. Endorses nausea, increase in urination. and yellow soft stools. Only surgical hx in abdominal area is a hysterectomy

## 2025-01-10 LAB — HOLD SPECIMEN: NORMAL

## 2025-01-13 ASSESSMENT — ENCOUNTER SYMPTOMS
DYSURIA: 0
WEIGHT LOSS: 0
VOMITING: 0
NAUSEA: 1
BELCHING: 0
FEVER: 0
MYALGIAS: 0
ANOREXIA: 1
ABDOMINAL PAIN: 1
ARTHRALGIAS: 1
HEMATURIA: 0
CONSTIPATION: 1
HEADACHES: 0
FLATUS: 0
DIARRHEA: 0
FREQUENCY: 0
HEMATOCHEZIA: 0

## 2025-01-14 ENCOUNTER — LAB (OUTPATIENT)
Dept: LAB | Facility: LAB | Age: 55
End: 2025-01-14
Payer: COMMERCIAL

## 2025-01-14 ENCOUNTER — APPOINTMENT (OUTPATIENT)
Dept: PRIMARY CARE | Facility: CLINIC | Age: 55
End: 2025-01-14
Payer: COMMERCIAL

## 2025-01-14 VITALS
TEMPERATURE: 98.1 F | WEIGHT: 284.38 LBS | DIASTOLIC BLOOD PRESSURE: 85 MMHG | HEART RATE: 75 BPM | SYSTOLIC BLOOD PRESSURE: 124 MMHG | BODY MASS INDEX: 43.24 KG/M2 | OXYGEN SATURATION: 96 %

## 2025-01-14 DIAGNOSIS — R10.9 ABDOMINAL PAIN, UNSPECIFIED ABDOMINAL LOCATION: Primary | ICD-10-CM

## 2025-01-14 DIAGNOSIS — R10.9 ABDOMINAL PAIN, UNSPECIFIED ABDOMINAL LOCATION: ICD-10-CM

## 2025-01-14 DIAGNOSIS — K76.0 HEPATIC STEATOSIS: ICD-10-CM

## 2025-01-14 DIAGNOSIS — F41.8 SITUATIONAL ANXIETY: ICD-10-CM

## 2025-01-14 LAB
ALBUMIN SERPL BCP-MCNC: 4.4 G/DL (ref 3.4–5)
ALP SERPL-CCNC: 72 U/L (ref 33–110)
ALT SERPL W P-5'-P-CCNC: 18 U/L (ref 7–45)
ANION GAP SERPL CALC-SCNC: 15 MMOL/L (ref 10–20)
AST SERPL W P-5'-P-CCNC: 20 U/L (ref 9–39)
BILIRUB SERPL-MCNC: 0.7 MG/DL (ref 0–1.2)
BUN SERPL-MCNC: 16 MG/DL (ref 6–23)
CALCIUM SERPL-MCNC: 9.5 MG/DL (ref 8.6–10.3)
CHLORIDE SERPL-SCNC: 99 MMOL/L (ref 98–107)
CO2 SERPL-SCNC: 27 MMOL/L (ref 21–32)
CREAT SERPL-MCNC: 1.02 MG/DL (ref 0.5–1.05)
EGFRCR SERPLBLD CKD-EPI 2021: 66 ML/MIN/1.73M*2
ERYTHROCYTE [DISTWIDTH] IN BLOOD BY AUTOMATED COUNT: 13.7 % (ref 11.5–14.5)
GLUCOSE SERPL-MCNC: 87 MG/DL (ref 74–99)
HCT VFR BLD AUTO: 43 % (ref 36–46)
HGB BLD-MCNC: 13.9 G/DL (ref 12–16)
LACTATE SERPL-SCNC: 1 MMOL/L (ref 0.4–2)
LIPASE SERPL-CCNC: 15 U/L (ref 9–82)
MCH RBC QN AUTO: 27 PG (ref 26–34)
MCHC RBC AUTO-ENTMCNC: 32.3 G/DL (ref 32–36)
MCV RBC AUTO: 84 FL (ref 80–100)
NRBC BLD-RTO: 0 /100 WBCS (ref 0–0)
PLATELET # BLD AUTO: 357 X10*3/UL (ref 150–450)
POC APPEARANCE, URINE: CLEAR
POC BILIRUBIN, URINE: ABNORMAL
POC BLOOD, URINE: NEGATIVE
POC COLOR, URINE: YELLOW
POC GLUCOSE, URINE: NEGATIVE MG/DL
POC KETONES, URINE: ABNORMAL MG/DL
POC LEUKOCYTES, URINE: NEGATIVE
POC NITRITE,URINE: NEGATIVE
POC PH, URINE: 5.5 PH
POC PROTEIN, URINE: NEGATIVE MG/DL
POC SPECIFIC GRAVITY, URINE: >=1.03
POC UROBILINOGEN, URINE: 0.2 EU/DL
POTASSIUM SERPL-SCNC: 3.6 MMOL/L (ref 3.5–5.3)
PROT SERPL-MCNC: 7.3 G/DL (ref 6.4–8.2)
RBC # BLD AUTO: 5.15 X10*6/UL (ref 4–5.2)
SODIUM SERPL-SCNC: 137 MMOL/L (ref 136–145)
WBC # BLD AUTO: 8.9 X10*3/UL (ref 4.4–11.3)

## 2025-01-14 PROCEDURE — 85027 COMPLETE CBC AUTOMATED: CPT

## 2025-01-14 PROCEDURE — 81003 URINALYSIS AUTO W/O SCOPE: CPT | Performed by: FAMILY MEDICINE

## 2025-01-14 PROCEDURE — 1036F TOBACCO NON-USER: CPT | Performed by: FAMILY MEDICINE

## 2025-01-14 PROCEDURE — 99214 OFFICE O/P EST MOD 30 MIN: CPT | Performed by: FAMILY MEDICINE

## 2025-01-14 PROCEDURE — 3079F DIAST BP 80-89 MM HG: CPT | Performed by: FAMILY MEDICINE

## 2025-01-14 PROCEDURE — 83690 ASSAY OF LIPASE: CPT

## 2025-01-14 PROCEDURE — RXMED WILLOW AMBULATORY MEDICATION CHARGE

## 2025-01-14 PROCEDURE — 3074F SYST BP LT 130 MM HG: CPT | Performed by: FAMILY MEDICINE

## 2025-01-14 PROCEDURE — 80053 COMPREHEN METABOLIC PANEL: CPT

## 2025-01-14 PROCEDURE — 83605 ASSAY OF LACTIC ACID: CPT

## 2025-01-14 RX ORDER — LORAZEPAM 0.5 MG/1
TABLET ORAL
Qty: 1 TABLET | Refills: 0 | Status: SHIPPED | OUTPATIENT
Start: 2025-01-14

## 2025-01-14 ASSESSMENT — ENCOUNTER SYMPTOMS
DYSURIA: 0
MYALGIAS: 0
VOMITING: 0
ARTHRALGIAS: 1
FLATUS: 0
HEADACHES: 0
DIARRHEA: 0
BELCHING: 0
FREQUENCY: 0
ABDOMINAL PAIN: 1
WEIGHT LOSS: 0
HEMATOCHEZIA: 0
ANOREXIA: 1
BLOOD IN STOOL: 0
CONSTIPATION: 1
HEMATURIA: 0
FEVER: 0
NAUSEA: 1

## 2025-01-14 NOTE — ASSESSMENT & PLAN NOTE
Check CT, but suspect costochondritis. May consider HIDA, due to some effect of food on her pain. DDx: gastritis, biliary dysfunction, shingles, (atypical) hernia, pancreatitis.  Orders:    POCT UA Automated manually resulted    CBC; Future    Comprehensive Metabolic Panel; Future    Lipase; Future    Lactate; Future    CT abdomen pelvis w IV contrast; Future    LORazepam (Ativan) 0.5 mg tablet; Take 30-60 minutes prior to procedure, for anxiety. Do not drive.

## 2025-01-14 NOTE — PATIENT INSTRUCTIONS
Recommend a low-sugar, low-simple-carbohydrate, low-fat, heart-healthy diet, weight, and lifestyle, and avoiding alcohol.     Please return or seek medical attention if symptoms persist, change, worsen, or return. For emergencies, call 9-1-1 or go to the nearest Emergency Room. Please schedule additional problem-focused appointment(s) to address additional problem(s).    Avoid taking Biotin (a vitamin, shows up particularly in hair/nail supplements) for a week prior to any blood tests, as it can interfere with certain results. Fasting for labs means 12 hours, nothing to eat or drink, except water and medications, unless directed otherwise.    For assistance with scheduling referrals or consultations, please call [phone]. For laboratory, radiology, and other tests, please call [phone] ([phone] for pediatrics). Please review prescription inserts and published information for possible adverse effects of all medications. Return after testing or consultation to review results and recommendations, if symptoms persist, change, worsen, or return, or if you have any question or concern. If you do not get results within 7-10 days, or you have any question or concern, please send a message, call the office ([phone]), or return to the office for a follow-up appointment. For non-emergencies, you may call the office. For emergencies, call 9-1-1 or go to the nearest Emergency Department. Please schedule additional appointment(s) to address concern(s) not addressed today. An annual Wellness visit is strongly recommended. A Wellness visit should be dedicated to addressing routine health maintenance matters (e.g., cancer screenings, cardiovascular screening, etc.). Problem-focused visits, typically prompted by symptoms or specific concerns, are usually conducted separately, particularly if multiple or complex problems need to be addressed.    In general, results are not released or discussed over the telephone,  but at an appointment or via  [employer]. Results of tests done through [facility] are released via  [employer] (see below).  [email or web address]   [employer] support line: [phone]

## 2025-01-14 NOTE — PROGRESS NOTES
"Subjective   Patient ID: Maryuri Arvizu is a 54 y.o. female who presents for Abdominal Pain (Pt presents c/o  RUQ pain has been going on for 1 week, ER F/U no gallbladder issues seen on US. Felt better this AM now bothering her again.).  Abdominal Pain  This is a new problem. The current episode started in the past 7 days. The onset quality is sudden. The problem occurs constantly. The problem has been gradually worsening. The pain is located in the RLQ, RUQ and epigastric region. The pain is at a severity of 4/10. The quality of the pain is aching, burning, cramping, a sensation of fullness and sharp. The abdominal pain radiates to the RLQ, RUQ and epigastric region. Associated symptoms include anorexia, arthralgias, constipation and nausea. Pertinent negatives include no belching, diarrhea, dysuria, fever, flatus, frequency, headaches, hematochezia, hematuria, melena, myalgias, vomiting or weight loss. The pain is aggravated by being still, certain positions, eating and movement. The pain is relieved by Certain positions.    Historian(s): Self    Started Tuesday. About the same, but perhaps better this morning, back to same now. Tried heating pad, which helps significantly. Gets \"twinges,\" denies numbness, tingling, pins&needle, skin change or rash. Feels better to lean to the left/contralateral side. Hasn't been able to identify any particularly bothersome foods. Motrin helped.     Review of Systems   Constitutional:  Negative for fever and weight loss.   Gastrointestinal:  Positive for abdominal pain, anorexia, constipation and nausea. Negative for blood in stool, diarrhea, flatus, hematochezia, melena and vomiting.   Genitourinary:  Negative for dysuria, frequency and hematuria.   Musculoskeletal:  Positive for arthralgias. Negative for myalgias.   Neurological:  Negative for headaches.     No LMP recorded. Patient has had a hysterectomy.    Patient Care Team:  Ricardo Lawson DO as PCP - General (Family " Medicine)  Ricardo Lawson DO as PCP - Employee [employer] PCP  Rama Garcia DO as Primary Care Provider  DO Hien Robbins MD MPH as Surgeon (Bariatrics)    Current Outpatient Medications   Medication Instructions    amLODIPine (Norvasc) 5 mg tablet TAKE 1 TABLET BY MOUTH ONCE DAILY    hydroCHLOROthiazide (HYDRODiuril) 25 mg tablet TAKE 1 TABLET BY MOUTH ONCE DAILY    LORazepam (Ativan) 0.5 mg tablet Take 30-60 minutes prior to procedure, for anxiety. Do not drive.       Objective   /85   Pulse 75   Temp 36.7 °C (98.1 °F)   Wt 129 kg (284 lb 6 oz)   SpO2 96%   BMI 43.24 kg/m²           Physical Exam  Vitals and nursing note reviewed.   Constitutional:       General: She is not in acute distress.     Appearance: Normal appearance. She is not diaphoretic.      Comments: No assistive device presently being used.   HENT:      Head: Normocephalic and atraumatic.   Eyes:      General: No scleral icterus.     Extraocular Movements: Extraocular movements intact.      Conjunctiva/sclera: Conjunctivae normal.   Cardiovascular:      Rate and Rhythm: Normal rate and regular rhythm.      Heart sounds: Normal heart sounds.   Pulmonary:      Effort: Pulmonary effort is normal. No respiratory distress.      Breath sounds: Normal breath sounds. No wheezing, rhonchi or rales.   Abdominal:      General: Bowel sounds are normal. There is no distension.      Palpations: Abdomen is soft. There is no hepatomegaly, splenomegaly or mass.      Tenderness: There is no abdominal tenderness. There is no right CVA tenderness, left CVA tenderness, guarding or rebound.   Musculoskeletal:         General: Tenderness (tip of right 11th rib and in soft tissues inferiorly) present.      Right lower leg: No edema.      Left lower leg: No edema.   Skin:     General: Skin is warm and dry.      Coloration: Skin is not jaundiced.      Findings: No bruising, erythema or rash.   Neurological:      General: No focal  deficit present.      Mental Status: She is alert and oriented to person, place, and time. Mental status is at baseline.   Psychiatric:         Mood and Affect: Mood normal.         Behavior: Behavior normal.         Thought Content: Thought content normal.         Assessment & Plan  Abdominal pain, unspecified abdominal location  Check CT, but suspect costochondritis. May consider HIDA, due to some effect of food on her pain. DDx: gastritis, biliary dysfunction, shingles, (atypical) hernia, pancreatitis.  Orders:    POCT UA Automated manually resulted    CBC; Future    Comprehensive Metabolic Panel; Future    Lipase; Future    Lactate; Future    CT abdomen pelvis w IV contrast; Future    LORazepam (Ativan) 0.5 mg tablet; Take 30-60 minutes prior to procedure, for anxiety. Do not drive.    Hepatic steatosis         Situational anxiety  For CT scan.  Orders:    LORazepam (Ativan) 0.5 mg tablet; Take 30-60 minutes prior to procedure, for anxiety. Do not drive.

## 2025-01-15 DIAGNOSIS — R10.9 ABDOMINAL PAIN, UNSPECIFIED ABDOMINAL LOCATION: Primary | ICD-10-CM

## 2025-01-17 ENCOUNTER — PHARMACY VISIT (OUTPATIENT)
Dept: PHARMACY | Facility: CLINIC | Age: 55
End: 2025-01-17
Payer: COMMERCIAL

## 2025-01-20 ENCOUNTER — HOSPITAL ENCOUNTER (OUTPATIENT)
Dept: RADIOLOGY | Facility: HOSPITAL | Age: 55
Discharge: HOME | End: 2025-01-20
Payer: COMMERCIAL

## 2025-01-20 DIAGNOSIS — R10.9 ABDOMINAL PAIN, UNSPECIFIED ABDOMINAL LOCATION: ICD-10-CM

## 2025-01-20 PROCEDURE — 2550000001 HC RX 255 CONTRASTS: Performed by: FAMILY MEDICINE

## 2025-01-20 PROCEDURE — 74177 CT ABD & PELVIS W/CONTRAST: CPT

## 2025-01-20 PROCEDURE — 74177 CT ABD & PELVIS W/CONTRAST: CPT | Performed by: RADIOLOGY

## 2025-01-20 RX ADMIN — IOHEXOL 75 ML: 350 INJECTION, SOLUTION INTRAVENOUS at 16:45

## 2025-01-31 DIAGNOSIS — I10 BENIGN ESSENTIAL HYPERTENSION: ICD-10-CM

## 2025-01-31 RX ORDER — AMLODIPINE BESYLATE 5 MG/1
5 TABLET ORAL DAILY
Qty: 90 TABLET | Refills: 1 | Status: SHIPPED | OUTPATIENT
Start: 2025-01-31 | End: 2025-07-30

## 2025-01-31 RX ORDER — HYDROCHLOROTHIAZIDE 25 MG/1
25 TABLET ORAL DAILY
Qty: 90 TABLET | Refills: 1 | Status: SHIPPED | OUTPATIENT
Start: 2025-01-31 | End: 2025-07-30

## 2025-02-03 PROCEDURE — RXMED WILLOW AMBULATORY MEDICATION CHARGE

## 2025-02-06 ENCOUNTER — PHARMACY VISIT (OUTPATIENT)
Dept: PHARMACY | Facility: CLINIC | Age: 55
End: 2025-02-06
Payer: COMMERCIAL

## 2025-02-06 ENCOUNTER — APPOINTMENT (OUTPATIENT)
Dept: SURGERY | Facility: CLINIC | Age: 55
End: 2025-02-06
Payer: COMMERCIAL

## 2025-02-06 VITALS — BODY MASS INDEX: 43.16 KG/M2 | HEIGHT: 68 IN | WEIGHT: 284.8 LBS

## 2025-02-06 DIAGNOSIS — E66.813 CLASS 3 SEVERE OBESITY DUE TO EXCESS CALORIES WITH SERIOUS COMORBIDITY AND BODY MASS INDEX (BMI) OF 45.0 TO 49.9 IN ADULT: Primary | ICD-10-CM

## 2025-02-06 DIAGNOSIS — R63.2 POLYPHAGIA: ICD-10-CM

## 2025-02-06 DIAGNOSIS — Z13.228 SCREENING FOR ENDOCRINE, METABOLIC AND IMMUNITY DISORDER: ICD-10-CM

## 2025-02-06 DIAGNOSIS — Z13.0 SCREENING FOR ENDOCRINE, METABOLIC AND IMMUNITY DISORDER: ICD-10-CM

## 2025-02-06 DIAGNOSIS — Z72.4 INAPPROPRIATE DIET AND EATING HABITS: ICD-10-CM

## 2025-02-06 DIAGNOSIS — E66.01 CLASS 3 SEVERE OBESITY DUE TO EXCESS CALORIES WITH SERIOUS COMORBIDITY AND BODY MASS INDEX (BMI) OF 45.0 TO 49.9 IN ADULT: Primary | ICD-10-CM

## 2025-02-06 DIAGNOSIS — Z72.3 INADEQUATE EXERCISE: ICD-10-CM

## 2025-02-06 DIAGNOSIS — Z13.29 SCREENING FOR ENDOCRINE, METABOLIC AND IMMUNITY DISORDER: ICD-10-CM

## 2025-02-06 PROCEDURE — 99204 OFFICE O/P NEW MOD 45 MIN: CPT

## 2025-02-06 PROCEDURE — 1036F TOBACCO NON-USER: CPT

## 2025-02-06 PROCEDURE — 3008F BODY MASS INDEX DOCD: CPT

## 2025-02-06 NOTE — PATIENT INSTRUCTIONS
*Please schedule with outpatient dietitian to optimize the dietary recommendations below to your individual food preferences and dietary patterns by calling [phone]*    *If lab work ordered for you today - complete prior to next visit - labs are fasting*    A few key concepts for weight management:  1. Calorie control is necessary for weight loss  2. Protein prioritization helps control appetite and helps to maintain lean/muscle body mass with weight loss  3. Fiber rich foods ensure balanced blood sugar and help you stay full, also high nutrient foods  4. Consistency is critical for long term success  5. Successful weight loss requires trade offs - we acknowledge that meal planning and some element of preparation for most days of the week is necessary for success  6. Set an environment for success - keep binge-worthy processed food products out of the home as much as possible  7. Tracking some component of your nutrition intake (whether it's calories, macros, carbs, meal structure, etc) is a tool to help you stay consistent and accountable  8. Regular physical activity is critical for weight loss maintenance. Exercise helps us maintain lean body mass (muscle mass) which helps to maintain a higher metabolic rate (how many calories your burn daily). Without maintaining lean mass/muscle mass, weight loss is more difficult to sustain long term.     Detailed Diet Recommendations:  Self monitoring through some metric can be a tool to help with weight loss. Measuring portions can be one way to monitor your nutrition intake to support health improvement.   - Some apps you can use are [employer], Lose It, Carb Manager, Cronometer, etc. Self monitoring is an extremely useful tool however please recognize that external tracking devices for activity and calories are not perfect, underestimating of total calorie intake is common, as well as overestimation through activity trackers of total calorie expenditure.  "    Please limit processed foods in the diet as they are known to contribute to obesity and weight gain. Processed foods include food items like bakery, chips, snack crackers, cereals, cookies, some pasta/breads, etc. These foods will typically come from \"bag, boxes with barcodes\" from the middle aisles of the grocery store - they contain added sugars and refined flours, are low in fiber and may stimulate appetite rather than help manage it effectively.     Increasing your intake of HIGH FIBER carbohydrates sources to increase fullness and appetite regulation with meals and between meals. High fiber foods are vegetables, fruit, beans, lentils, nuts/seeds, etc.      WHAT WILL MY PLATE LOOK LIKE IF I EAT LIKE THIS?.    - Aim for MINIMUM 25-30g of protein at meals: approx 4 oz of high protein food like chicken, fish, turkey, beef, pork, 2-3 eggs, 1/2-3/4 cup cottage cheese or plain greek yogurt. At each meal EAT PROTEIN FIRST! This sets the stage to help better regulate your appetite during and between meals. Think \"Palm of your hand portion of protein\" at each meal.     - The remainder of the plate is optimized to increase quality of the diet by including plenty of vegetables, appropriate fruit or starch portions: Aim for \"2 fist sized portions of fruits and vegetables with each meal\". A \"fist sized\" portion is about 1 cup.   - Choose a variety of vegetables, fruits and whole grains - aim to eat a wide variety of colors to improve quality of diet.     Fat included with meals is best in small portions - Fat is calorie dense so it is important to monitor the portion, to avoid excess calories. Keep servings to the followin-2 tbsp olive/avocado/coconut oil (can be used to cook or dress vegetables), 1-2 tbsp unsweetened nut butter (peanut, almond, etc), 5-6 olives, 1/3 avocado, 1/2-1 oz nuts/seeds likes walnuts, almonds, pecans, brazil nuts, etc. This would include if you are cooking your meal with oil/butter/etc. "     Please avoid liquid sources of calories as we often do not think about their contribution to our total daily calorie intake nor do they help regulate appetite when working towards weight loss. Hydration is important, aim for at least 64 oz zero calorie beverages daily, ideally water.     Exercise Recommendations:   Continue regular exercise regimen - you are following an appropriate program of 4x/week of resistance training & aerobic training for 60-90 minutes per session, achieving > than 200 minute per week goal.    Web based resources for meal planning:  www.[employer] - This is a website authored by registered dietitians, has many great resources for healthy nutrition.       Emotional eating involves more than just psychological factors.   It is a complex behavior influenced by a combination of emotional, physiological, and environmental factors.   While thoughts and emotions play a significant role, other factors such as stress, hormones, genetics, and environmental factors can contribute to emotional eating.     Additionally, external cues, such as the availability of food, along with the constant advertising of food products, can trigger emotional eating responses. Therefore, it is essential to recognize that emotional eating is a complex issue that extends beyond the scope of psychological factors alone.    Emotional eating can have a significant impact on weight loss efforts.   Here are some ways in which emotional eating can impact weight loss efforts and potentially slow down or sabotage your weight loss journey:    -Consuming excess calories:   Emotional eating often involves eating high-calorie, comfort foods that are typically high in fat, sugar, and processed ingredients. These foods can lead to consuming more calories than your body needs, making it difficult to maintain the calorie deficit necessary for weight loss.    -Emotional eating episodes:   Emotional eating tends to be driven by  emotions rather than physical hunger. These episodes can result in consuming large quantities of food quickly, leading to overeating and consuming more calories than you realize.    -Unhealthy food choices:   Emotional eating often involves choosing unhealthy food options that are low in nutrients and high in empty calories. These foods lack the necessary nutrients to support overall health and weight loss.    -Emotional cycles and guilt:   Emotional eating can create a cycle of negative emotions, such as guilt, shame, or regret, which can perpetuate further emotional eating. This cycle makes it challenging to maintain a consistent and healthy eating pattern necessary for weight loss.    -Disrupted eating patterns:   Emotional eating can disrupt your normal eating patterns, leading to irregular meals, skipping meals, or chaotic eating habits. These disruptions can affect your metabolism and make it harder to achieve weight loss goals.    Managing emotional eating is essential for successful weight loss. By identifying and addressing the emotional triggers and developing healthier coping mechanisms, you can reduce the impact of emotional eating on your weight loss journey. Creating a balanced and sustainable approach to eating, focusing on nourishing your body with nutrient-dense foods, and seeking support from professionals or a support network can greatly contribute to your weight loss efforts.    -Identify triggers:   Pay attention to the emotions, situations, or events that tend to trigger your emotional eating episodes. Keeping a journal can help you track patterns and identify common triggers.    -Find alternative coping mechanisms:   Explore healthier ways to manage your emotions instead of turning to food. Engage in activities that bring you kemi, such as practicing mindfulness, deep breathing exercises, taking a walk, writing in a journal, or talking to a supportive friend.    -Create a supportive  environment:   Remove tempting, unhealthy foods from your home and replace them with nutritious options. Surround yourself with a supportive network of friends and family who understand your goals and can provide encouragement.    -Practice mindful eating:   Slow down and pay attention to your eating habits. Eat slowly, savoring each bite, and listen to your body's signals of hunger and fullness. This can help you differentiate between true physical hunger and emotional cravings.    -Seek professional help:   Consider reaching out to a therapist or counselor who specializes in emotional eating. They can provide valuable guidance, support, and strategies tailored to your specific needs.    -Establish a balanced lifestyle:   Focus on overall well-being by adopting a balanced and nourishing diet, engaging in regular physical activity, getting enough sleep, and managing stress effectively. A healthy lifestyle can help reduce the urge to rely on food for emotional comfort.    Follow up: 4-6 weeks    If appointment was not scheduled before leaving today please call [phone] to speak with an .    Follow up visits are typically VIRTUAL  - Please have a reliable scale to weigh yourself at home for follow up visits  - Please have a blood pressure cuff to monitor blood pressure & heart rate at home (can be purchased over the counter, on Amazon, drug store, etc).   - For virtual visits you must be in the State of [place]  - YOU CANNOT BE DRIVING, please remember this is an appointment and should be treated the same as if you were in the office for follow up appointment.

## 2025-02-06 NOTE — PROGRESS NOTES
"Subjective     Patient ID: Maryuri Arvizu is a 54 y.o. female who presents for Weight Loss.    HPI  Initial Weight: 284 lbs  Initial BMI: 43.3  Highest Weight: 303 lbs  Lowest weight: 100 lbs    Patient states that she has started gaining weight after she had had her first child in mid twenties. She then has gained 60 lbs in 6 weeks while she was on maternity leave.   Maryuri states that she constantly thinks about food, especially sweets. She even wakes up at night to have some maple syrup. She states that she does not feel full until it is too late, and she has already overeaten. She denies personal h/o Diabetes, other metabolic disorders. She does not keep track of her caloric intake.     Otc or Prescription medications - denies  High-Sugar beverages: yes  FF/going out/take out: almost daily    Past Medical History:   Diagnosis Date    Acute viral sinusitis 08/17/2023    Left ankle swelling 10/24/2016    Left knee pain 08/17/2023    Nausea and vomiting 11/01/2024    Other specified abnormal findings of blood chemistry     Melanemia    Other specified health status     Known health problems: none    Otitis media, acute 08/17/2023    Personal history of malignant melanoma of skin     History of malignant melanoma    Sore throat 08/17/2023      Past Surgical History:   Procedure Laterality Date    HYSTERECTOMY  05/23/2014    Hysterectomy      Family History   Problem Relation Name Age of Onset    Thyroid cancer Father        Social History     Tobacco Use   Smoking Status Former    Types: Cigarettes   Smokeless Tobacco Never      Social History     Substance and Sexual Activity   Drug Use Never      Social History     Substance and Sexual Activity   Alcohol Use Yes    Comment: rarely        Allergies  Allergies   Allergen Reactions    Codeine Other     nightmares    Lisinopril Cough          VITALS  Visit Vitals  Ht 1.727 m (5' 8\")   Wt 129 kg (284 lb 12.8 oz)   BMI 43.30 kg/m²   OB Status Hysterectomy   Smoking " "Status Former   BSA 2.49 m²        LABS  Lab Results   Component Value Date/Time    YMQDXKMZ36 307 09/28/2023 0737    TSH 3.61 04/08/2022 0824    VITD25 37 09/28/2023 0737    TIBC 385 09/28/2023 0737    IRON 52 09/28/2023 0737    FERRITIN 134 09/28/2023 0737     Lab Results   Component Value Date    WBC 8.9 01/14/2025    HGB 13.9 01/14/2025    HCT 43.0 01/14/2025    MCV 84 01/14/2025     01/14/2025            No lab exists for component: \"LABALBU\"   Lab Results   Component Value Date    GLUCOSE 87 01/14/2025    CALCIUM 9.5 01/14/2025     01/14/2025    K 3.6 01/14/2025    CO2 27 01/14/2025    CL 99 01/14/2025    BUN 16 01/14/2025    CREATININE 1.02 01/14/2025       ROS  Review of Systems  GENERAL: Denies fever/chills       HEENT: Denies headache, new or worsening vision and hearing problems, nasal congestion, hoarseness, sore throat             CV: Denies chest pain, palpitations         RESP: Denies SOB, cough, wheezing              GI: Denies n/v, abdominal pain, diarrhea, constipation            : Denies urinary urgency/frequency     NEURO: Denies headache, weakness, numbness, tingling       ENDO: Denies excessive heat/cold, recent weight gain/loss        MUSC:Endorses low back pain, knees  joint pain      PSYCH: Denies substance use disorder, depression. Anxiety at times       PHYSICAL EXAM  Physical Exam  General- No acute distress, well appearing and well nourished. Obese  HEENT - no erythema, swelling or discharge. Neck supple, no cervical lymphadenopathy.   Pulmonary - respiratory effort normal, lungs CTAB.   Cardiovascular - HRR, no m/r/g  Extremities - no edema and/or varicosities, no peripheral edema  Abdomen - Soft, Non-tender, non-distended, no abdominal masses.   Musculoskeletal - Range of motion WNL  Skin - normal without rashes or lesions.  Neurologic - reflexes intact, coordination WNL, gait WNL  Psychiatric - AXO x3, mood and affect appropriate      ASSESSMENT/PLAN  Patient here for " medical weight loss.    Assessment/Plan   Problem List Items Addressed This Visit       Class 3 severe obesity due to excess calories with serious comorbidity and body mass index (BMI) of 45.0 to 49.9 in adult - Primary    Relevant Orders    Referral to Nutrition Services    Screening for endocrine, metabolic and immunity disorder    Relevant Orders    Glucose, Fasting    QUEST INSULIN    Inappropriate diet and eating habits    Relevant Orders    Referral to Nutrition Services    Inadequate exercise    Relevant Orders    Referral to Nutrition Services    Polyphagia     Initial Weight: 284 lbs  Initial BMI: 43.3    Reviewed past and active medical history, patient has no current contraindications to use of medication for adjunct treatment in weight management.  Discussed that patient has to start with the lifestyle modifications, such as adjust her diet and start with daily exercise. She has to adhere to the new lifestyle for 4-6 weeks, keep her food and exercise logs. These modifications will help the patient loose some weight, which will be considered a good ez effort. After that we can start adding weight loss medications. Reviewed that medications for weight loss may be short or long term, but that if weight loss is not realized within 12 weeks of initiating, medication will be discontinued to review alternative treatment options.    1500 sidra meal plans, High Protein Snacks, [employer] Shopping List, Protein Count Guide and other handouts given.  Nutrition consult is placed.  Emphasized that medications are used as adjunct to diet and exercise for weight management    Counseled on dietary patterns to support weight loss and need for regular aerobic and strength based exercises to enhance weight loss and maintenance.      > 50% of time spent counseling on the importance of following recommended dietary modifications including: role of diet, low calorie and carbohydrate restrictions, limiting fast food and avoiding  high sugar beverages.   Also discussed, the role of exercise with an ultimate goal of at least 250-300 minutes a week for weight loss and weight maintenance.    Follow-up:  4-6 weeks    RAKAN Nicole-CNP

## 2025-05-09 ENCOUNTER — OFFICE VISIT (OUTPATIENT)
Dept: PRIMARY CARE | Facility: CLINIC | Age: 55
End: 2025-05-09
Payer: COMMERCIAL

## 2025-05-09 VITALS
SYSTOLIC BLOOD PRESSURE: 119 MMHG | BODY MASS INDEX: 41.83 KG/M2 | HEART RATE: 77 BPM | WEIGHT: 275.13 LBS | DIASTOLIC BLOOD PRESSURE: 77 MMHG | TEMPERATURE: 97.1 F | OXYGEN SATURATION: 96 %

## 2025-05-09 DIAGNOSIS — J01.90 ACUTE NON-RECURRENT SINUSITIS, UNSPECIFIED LOCATION: Primary | ICD-10-CM

## 2025-05-09 PROCEDURE — 3074F SYST BP LT 130 MM HG: CPT | Performed by: FAMILY MEDICINE

## 2025-05-09 PROCEDURE — 3078F DIAST BP <80 MM HG: CPT | Performed by: FAMILY MEDICINE

## 2025-05-09 PROCEDURE — RXMED WILLOW AMBULATORY MEDICATION CHARGE

## 2025-05-09 PROCEDURE — 1036F TOBACCO NON-USER: CPT | Performed by: FAMILY MEDICINE

## 2025-05-09 PROCEDURE — 99213 OFFICE O/P EST LOW 20 MIN: CPT | Performed by: FAMILY MEDICINE

## 2025-05-09 RX ORDER — AMOXICILLIN AND CLAVULANATE POTASSIUM 875; 125 MG/1; MG/1
875 TABLET, FILM COATED ORAL 2 TIMES DAILY
Qty: 20 TABLET | Refills: 0 | Status: SHIPPED | OUTPATIENT
Start: 2025-05-09 | End: 2025-05-19

## 2025-05-09 ASSESSMENT — ENCOUNTER SYMPTOMS
HEADACHES: 1
VOICE CHANGE: 1
SORE THROAT: 0
COUGH: 1
CHILLS: 0
FEVER: 0
RHINORRHEA: 0

## 2025-05-09 NOTE — PROGRESS NOTES
Subjective   Patient ID: Maryuri Arvizu is a 54 y.o. female who presents for Cough (Pt presents cough, nasal drainage/congestion, cough is productive yellow, negative Covid. Barky voice.).  Cough  This is a new problem. The current episode started in the past 7 days. The problem has been gradually worsening. The problem occurs hourly. The cough is Productive of sputum. Associated symptoms include headaches. Pertinent negatives include no chest pain, chills, ear congestion, ear pain, fever, nasal congestion, postnasal drip, rhinorrhea or sore throat. The symptoms are aggravated by lying down. Risk factors for lung disease include travel.    Historian(s): Self    Progressed to cough productive of purulent sputum. Teeth on right hurt. Ears feel ok. Ill contacts. Sore throat resolved by Saturday evening. Tried leftover Amoxicillin 250(?) mg TID x7 tablets.    Review of Systems   Constitutional:  Negative for chills and fever.   HENT:  Positive for congestion and voice change. Negative for ear pain, postnasal drip, rhinorrhea and sore throat.    Respiratory:  Positive for cough.    Cardiovascular:  Negative for chest pain.   Neurological:  Positive for headaches.   All other systems reviewed and are negative.    No LMP recorded. Patient has had a hysterectomy.    Tobacco Use History[1]  Social History     Substance and Sexual Activity   Alcohol Use Yes    Comment: rarely     Social History     Substance and Sexual Activity   Drug Use Never       Family History[2]    Patient Care Team:  Ricardo Lawson DO as PCP - General (Family Medicine)  RAKAN Nicole-CNP as PCP - [employer] PCP  Rama Garcia DO as Primary Care Provider  DO Hien Robbins MD MPH as Surgeon (Bariatrics)    RX Allergies[3]    Prior to Admission medications    Medication Sig Start Date End Date Taking? Authorizing Provider   amLODIPine (Norvasc) 5 mg tablet TAKE 1 TABLET BY MOUTH ONCE DAILY 1/31/25 8/7/25   Ricardo WILKINSON Renny, DO   hydroCHLOROthiazide (HYDRODiuril) 25 mg tablet TAKE 1 TABLET BY MOUTH ONCE DAILY 1/31/25 8/7/25  Ricardo Lawson DO   LORazepam (Ativan) 0.5 mg tablet Take 30-60 minutes prior to procedure, for anxiety. Do not drive. 1/14/25   Ricardo Lawson DO        Objective   /77   Pulse 77   Temp 36.2 °C (97.1 °F)   Wt 125 kg (275 lb 2 oz)   SpO2 96%   BMI 41.83 kg/m²           Physical Exam  Vitals and nursing note reviewed.   Constitutional:       General: She is not in acute distress.     Appearance: Normal appearance. She is not diaphoretic.      Comments: No assistive device presently being used.  Mask   HENT:      Head: Normocephalic and atraumatic.      Right Ear: Tympanic membrane, ear canal and external ear normal. There is no impacted cerumen.      Left Ear: Tympanic membrane, ear canal and external ear normal. There is no impacted cerumen.      Nose: Congestion and rhinorrhea present.      Mouth/Throat:      Mouth: Mucous membranes are moist.      Pharynx: Oropharynx is clear. No posterior oropharyngeal erythema.      Comments: dysphonia  Eyes:      General: No scleral icterus.     Extraocular Movements: Extraocular movements intact.      Conjunctiva/sclera: Conjunctivae normal.   Cardiovascular:      Rate and Rhythm: Normal rate and regular rhythm.      Heart sounds: Normal heart sounds.   Pulmonary:      Effort: Pulmonary effort is normal. No respiratory distress.      Breath sounds: Normal breath sounds. No wheezing, rhonchi or rales.   Musculoskeletal:      Cervical back: Normal range of motion and neck supple. No tenderness.   Lymphadenopathy:      Cervical: No cervical adenopathy.   Skin:     General: Skin is warm and dry.      Coloration: Skin is not jaundiced.   Neurological:      General: No focal deficit present.      Mental Status: She is alert and oriented to person, place, and time. Mental status is at baseline.   Psychiatric:         Mood and Affect: Mood  normal.         Behavior: Behavior normal.         Thought Content: Thought content normal.         Assessment & Plan  Acute non-recurrent sinusitis, unspecified location  With laryngitis. Day ~8. Augmentin if not starting to improve within 1-2 days, or any worsening.  Orders:    amoxicillin-clavulanate (Augmentin) 875-125 mg tablet; Take 1 tablet (875 mg) by mouth 2 times a day for 10 days.                        [1]   Social History  Tobacco Use   Smoking Status Former    Types: Cigarettes   Smokeless Tobacco Never   [2]   Family History  Problem Relation Name Age of Onset    Thyroid cancer Father     [3]   Allergies  Allergen Reactions    Codeine Other     nightmares    Lisinopril Cough

## 2025-05-09 NOTE — PATIENT INSTRUCTIONS
Please return or seek medical attention if symptoms persist, change, worsen, or return. For emergencies, call 9-1-1 or go to the nearest Emergency Room. Please schedule additional problem-focused appointment(s) to address additional problem(s). Simply mentioning or talking briefly about a problem or concern does not necessarily mean it is currently being addressed. Time constraints dictate that not every problem/concern can always be addressed.    Avoid taking Biotin (a vitamin, shows up particularly in hair/nail supplements) for a week prior to any blood tests, as it can interfere with certain results. Fasting for labs means 12 hours, nothing to eat or drink, except water and medications, unless directed otherwise.    For assistance with scheduling referrals or consultations, please call [phone]. For laboratory, radiology, and other tests, please call [phone] ([phone] for pediatrics). Please review prescription inserts and published information for possible adverse effects of all medications. Return after testing or consultation to review results and recommendations, if symptoms persist, change, worsen, or return, or if you have any question or concern. If you do not get results within 7-10 days, or you have any question or concern, please send a message, call the office ([phone]), or return to the office for a follow-up appointment. For non-emergencies, you may call the office. For emergencies, call 9-1-1 or go to the nearest Emergency Department. Please schedule additional appointment(s) to address concern(s) not addressed today. An annual Wellness visit is strongly recommended. A Wellness visit should be dedicated to addressing routine health maintenance matters (e.g., cancer screenings, cardiovascular screening, etc.). Problem-focused visits, typically prompted by symptoms or specific concerns, are usually conducted separately, particularly if multiple or complex problems need to be  addressed.    In general, results are not released or discussed over the telephone, but at an appointment or via  [employer]. Results of tests done through [facility] [place] are released via  [facility] (see below).  [email or web address]   [facility] [employer] support line: [phone]

## 2025-05-15 ENCOUNTER — PHARMACY VISIT (OUTPATIENT)
Dept: PHARMACY | Facility: CLINIC | Age: 55
End: 2025-05-15
Payer: COMMERCIAL

## 2025-08-26 ENCOUNTER — APPOINTMENT (OUTPATIENT)
Dept: CARDIOLOGY | Facility: HOSPITAL | Age: 55
End: 2025-08-26
Payer: COMMERCIAL

## 2025-08-26 ENCOUNTER — TELEPHONE (OUTPATIENT)
Dept: PRIMARY CARE | Facility: CLINIC | Age: 55
End: 2025-08-26
Payer: COMMERCIAL

## 2025-08-26 ENCOUNTER — APPOINTMENT (OUTPATIENT)
Dept: RADIOLOGY | Facility: HOSPITAL | Age: 55
End: 2025-08-26
Payer: COMMERCIAL

## 2025-08-26 ENCOUNTER — HOSPITAL ENCOUNTER (EMERGENCY)
Facility: HOSPITAL | Age: 55
Discharge: HOME | End: 2025-08-26
Payer: COMMERCIAL

## 2025-08-26 VITALS
OXYGEN SATURATION: 98 % | BODY MASS INDEX: 42.44 KG/M2 | WEIGHT: 280 LBS | TEMPERATURE: 97.9 F | RESPIRATION RATE: 20 BRPM | HEIGHT: 68 IN | HEART RATE: 80 BPM | SYSTOLIC BLOOD PRESSURE: 155 MMHG | DIASTOLIC BLOOD PRESSURE: 92 MMHG

## 2025-08-26 DIAGNOSIS — I10 ACCELERATED HYPERTENSION: Primary | ICD-10-CM

## 2025-08-26 LAB
ALBUMIN SERPL BCP-MCNC: 4.2 G/DL (ref 3.4–5)
ALP SERPL-CCNC: 76 U/L (ref 33–110)
ALT SERPL W P-5'-P-CCNC: 31 U/L (ref 7–45)
ANION GAP SERPL CALC-SCNC: 13 MMOL/L (ref 10–20)
AST SERPL W P-5'-P-CCNC: 28 U/L (ref 9–39)
BASOPHILS # BLD AUTO: 0.03 X10*3/UL (ref 0–0.1)
BASOPHILS NFR BLD AUTO: 0.4 %
BILIRUB SERPL-MCNC: 0.6 MG/DL (ref 0–1.2)
BUN SERPL-MCNC: 11 MG/DL (ref 6–23)
CALCIUM SERPL-MCNC: 9.2 MG/DL (ref 8.6–10.3)
CARDIAC TROPONIN I PNL SERPL HS: 3 NG/L (ref 0–13)
CARDIAC TROPONIN I PNL SERPL HS: 4 NG/L (ref 0–13)
CHLORIDE SERPL-SCNC: 100 MMOL/L (ref 98–107)
CO2 SERPL-SCNC: 28 MMOL/L (ref 21–32)
CREAT SERPL-MCNC: 0.87 MG/DL (ref 0.5–1.05)
EGFRCR SERPLBLD CKD-EPI 2021: 79 ML/MIN/1.73M*2
EOSINOPHIL # BLD AUTO: 0.03 X10*3/UL (ref 0–0.7)
EOSINOPHIL NFR BLD AUTO: 0.4 %
ERYTHROCYTE [DISTWIDTH] IN BLOOD BY AUTOMATED COUNT: 14.4 % (ref 11.5–14.5)
GLUCOSE SERPL-MCNC: 108 MG/DL (ref 74–99)
HCT VFR BLD AUTO: 44.3 % (ref 36–46)
HGB BLD-MCNC: 14.5 G/DL (ref 12–16)
IMM GRANULOCYTES # BLD AUTO: 0.03 X10*3/UL (ref 0–0.7)
IMM GRANULOCYTES NFR BLD AUTO: 0.4 % (ref 0–0.9)
LYMPHOCYTES # BLD AUTO: 1.74 X10*3/UL (ref 1.2–4.8)
LYMPHOCYTES NFR BLD AUTO: 23.2 %
MAGNESIUM SERPL-MCNC: 2.06 MG/DL (ref 1.6–2.4)
MCH RBC QN AUTO: 27.3 PG (ref 26–34)
MCHC RBC AUTO-ENTMCNC: 32.7 G/DL (ref 32–36)
MCV RBC AUTO: 83 FL (ref 80–100)
MONOCYTES # BLD AUTO: 0.43 X10*3/UL (ref 0.1–1)
MONOCYTES NFR BLD AUTO: 5.7 %
NEUTROPHILS # BLD AUTO: 5.25 X10*3/UL (ref 1.2–7.7)
NEUTROPHILS NFR BLD AUTO: 69.9 %
NRBC BLD-RTO: 0 /100 WBCS (ref 0–0)
PLATELET # BLD AUTO: 291 X10*3/UL (ref 150–450)
POTASSIUM SERPL-SCNC: 3.6 MMOL/L (ref 3.5–5.3)
PROT SERPL-MCNC: 7.4 G/DL (ref 6.4–8.2)
RBC # BLD AUTO: 5.31 X10*6/UL (ref 4–5.2)
SODIUM SERPL-SCNC: 137 MMOL/L (ref 136–145)
WBC # BLD AUTO: 7.5 X10*3/UL (ref 4.4–11.3)

## 2025-08-26 PROCEDURE — 85025 COMPLETE CBC W/AUTO DIFF WBC: CPT

## 2025-08-26 PROCEDURE — 83735 ASSAY OF MAGNESIUM: CPT

## 2025-08-26 PROCEDURE — 99285 EMERGENCY DEPT VISIT HI MDM: CPT | Mod: 25

## 2025-08-26 PROCEDURE — 36415 COLL VENOUS BLD VENIPUNCTURE: CPT

## 2025-08-26 PROCEDURE — 71045 X-RAY EXAM CHEST 1 VIEW: CPT | Mod: FOREIGN READ | Performed by: STUDENT IN AN ORGANIZED HEALTH CARE EDUCATION/TRAINING PROGRAM

## 2025-08-26 PROCEDURE — 84484 ASSAY OF TROPONIN QUANT: CPT

## 2025-08-26 PROCEDURE — 93005 ELECTROCARDIOGRAM TRACING: CPT

## 2025-08-26 PROCEDURE — 71045 X-RAY EXAM CHEST 1 VIEW: CPT

## 2025-08-26 PROCEDURE — 80053 COMPREHEN METABOLIC PANEL: CPT

## 2025-08-26 ASSESSMENT — LIFESTYLE VARIABLES
HAVE YOU EVER FELT YOU SHOULD CUT DOWN ON YOUR DRINKING: NO
EVER HAD A DRINK FIRST THING IN THE MORNING TO STEADY YOUR NERVES TO GET RID OF A HANGOVER: NO
EVER FELT BAD OR GUILTY ABOUT YOUR DRINKING: NO
TOTAL SCORE: 0
HAVE PEOPLE ANNOYED YOU BY CRITICIZING YOUR DRINKING: NO

## 2025-08-26 ASSESSMENT — PAIN SCALES - GENERAL
PAINLEVEL_OUTOF10: 0 - NO PAIN
PAINLEVEL_OUTOF10: 0 - NO PAIN

## 2025-08-26 ASSESSMENT — PAIN - FUNCTIONAL ASSESSMENT: PAIN_FUNCTIONAL_ASSESSMENT: 0-10

## 2025-08-27 ENCOUNTER — PATIENT OUTREACH (OUTPATIENT)
Dept: CARE COORDINATION | Facility: CLINIC | Age: 55
End: 2025-08-27
Payer: COMMERCIAL

## 2025-08-28 LAB
ATRIAL RATE: 73 BPM
P AXIS: 1 DEGREES
P OFFSET: 188 MS
P ONSET: 137 MS
PR INTERVAL: 158 MS
Q ONSET: 216 MS
QRS COUNT: 12 BEATS
QRS DURATION: 84 MS
QT INTERVAL: 380 MS
QTC CALCULATION(BAZETT): 418 MS
QTC FREDERICIA: 405 MS
R AXIS: -7 DEGREES
T AXIS: -3 DEGREES
T OFFSET: 406 MS
VENTRICULAR RATE: 73 BPM

## 2025-09-03 ENCOUNTER — PATIENT OUTREACH (OUTPATIENT)
Dept: CARE COORDINATION | Facility: CLINIC | Age: 55
End: 2025-09-03
Payer: COMMERCIAL

## 2025-09-22 ENCOUNTER — APPOINTMENT (OUTPATIENT)
Dept: PRIMARY CARE | Facility: CLINIC | Age: 55
End: 2025-09-22
Payer: COMMERCIAL